# Patient Record
Sex: MALE | Race: WHITE | NOT HISPANIC OR LATINO | URBAN - METROPOLITAN AREA
[De-identification: names, ages, dates, MRNs, and addresses within clinical notes are randomized per-mention and may not be internally consistent; named-entity substitution may affect disease eponyms.]

---

## 2021-09-03 ENCOUNTER — TELEPHONE (OUTPATIENT)
Dept: ENDOCRINOLOGY | Facility: MEDICAL CENTER | Age: 46
End: 2021-09-03

## 2021-09-03 ENCOUNTER — OFFICE VISIT (OUTPATIENT)
Dept: ENDOCRINOLOGY | Facility: MEDICAL CENTER | Age: 46
End: 2021-09-03
Attending: NURSE PRACTITIONER
Payer: COMMERCIAL

## 2021-09-03 VITALS
SYSTOLIC BLOOD PRESSURE: 128 MMHG | HEIGHT: 76 IN | WEIGHT: 257.1 LBS | HEART RATE: 96 BPM | OXYGEN SATURATION: 96 % | BODY MASS INDEX: 31.31 KG/M2 | DIASTOLIC BLOOD PRESSURE: 80 MMHG

## 2021-09-03 DIAGNOSIS — E55.9 VITAMIN D DEFICIENCY: ICD-10-CM

## 2021-09-03 DIAGNOSIS — E05.90 HYPERTHYROIDISM: ICD-10-CM

## 2021-09-03 PROBLEM — R03.0 ELEVATED BP WITHOUT DIAGNOSIS OF HYPERTENSION: Status: ACTIVE | Noted: 2021-03-09

## 2021-09-03 PROBLEM — H83.3X2 NOISE-INDUCED HEARING LOSS OF LEFT EAR: Status: ACTIVE | Noted: 2021-03-09

## 2021-09-03 PROBLEM — E66.9 OBESITY (BMI 30.0-34.9): Status: ACTIVE | Noted: 2021-03-09

## 2021-09-03 PROBLEM — F17.200 CURRENT EVERY DAY SMOKER: Status: ACTIVE | Noted: 2021-03-09

## 2021-09-03 PROCEDURE — 99211 OFF/OP EST MAY X REQ PHY/QHP: CPT | Performed by: NURSE PRACTITIONER

## 2021-09-03 PROCEDURE — 99214 OFFICE O/P EST MOD 30 MIN: CPT | Performed by: NURSE PRACTITIONER

## 2021-09-03 RX ORDER — CLARITHROMYCIN 500 MG/1
TABLET, COATED ORAL
COMMUNITY
Start: 2021-07-14

## 2021-09-03 RX ORDER — METHIMAZOLE 10 MG/1
10 TABLET ORAL
Qty: 60 TABLET | Refills: 4 | Status: SHIPPED | OUTPATIENT
Start: 2021-09-03 | End: 2022-02-28

## 2021-09-03 RX ORDER — SILDENAFIL CITRATE 20 MG/1
TABLET ORAL
COMMUNITY
Start: 2021-07-14

## 2021-09-03 NOTE — TELEPHONE ENCOUNTER
VOICEMAIL  1. Caller Name: Mookie Tay                        Call Back Number: [phone]      2. Message: Patient called because he was told by kalani today that he had new medication sent to pharmacy. He was wondering if she had sent it to his pharmacy.     3. Patient approves office to leave a detailed voicemail/MyChart message: yes

## 2021-11-17 ENCOUNTER — TELEMEDICINE (OUTPATIENT)
Dept: ENDOCRINOLOGY | Facility: MEDICAL CENTER | Age: 46
End: 2021-11-17
Attending: NURSE PRACTITIONER
Payer: COMMERCIAL

## 2021-11-17 DIAGNOSIS — E55.9 VITAMIN D DEFICIENCY: ICD-10-CM

## 2021-11-17 DIAGNOSIS — E05.90 HYPERTHYROIDISM: ICD-10-CM

## 2021-11-17 PROCEDURE — 99214 OFFICE O/P EST MOD 30 MIN: CPT | Mod: 95 | Performed by: NURSE PRACTITIONER

## 2021-11-17 PROCEDURE — 999999 HB NO CHARGE: Performed by: NURSE PRACTITIONER

## 2021-11-17 RX ORDER — AZITHROMYCIN 250 MG/1
TABLET, FILM COATED ORAL
COMMUNITY
Start: 2021-11-12

## 2021-11-17 RX ORDER — MONTELUKAST SODIUM 10 MG/1
TABLET ORAL
COMMUNITY
Start: 2021-10-27

## 2021-11-17 NOTE — PROGRESS NOTES
Chief Complaint: Follow up evaluation of Hyperthyroidism.   Patient was presented for a telehealth consultation via secure and encrypted videoconferencing technology. This encounter was conducted via Zoom . Verbal consent was obtained. Patient's identity was verified.  HPI:     Mookie Tay is a 45 y.o. male with history of Hyperthyroidism diagnosed in 2013 and is here for initial evaluation.  He reports the following symptoms: cold intolerance & heat intolerance, almost lost consciousness with increased temperatures, increased palpitations, insomnia, diarrhea and constipation. Denies lumps or enlargement in the neck. Denies dysphagia, SOB and anterior neck pain.    Patient reports overall he is feeling much improved.  Insomnia has resolved heat intolerance has improved significantly.  He is currently at home sick secondary to an upper respiratory infection.    Patient was unable to complete his labs prior to this appointment secondary to the current infection.      Patient is currently taking methimazole 20 mg daily.    Previous labs:   Labs Com[facility] at [facility] 03/09/2020: TSH 0.010, FT4 1.78  Labs Completed at [facility] [place] 05/19/2021: TSH 0.000, FT4 3.29.  Labs Completed at [facility] [place] 05/24/2021: TSH 0.000, T4 14.5. FT4 3.18.     Patient's medications, allergies, and social histories were reviewed and updated as appropriate.      ROS:     CONS:     No fever, no chills, no weight loss, no fatigue   EYES:      No diplopia, no blurry vision, no redness of eyes, no swelling of eyelids   ENT:    No hearing loss, No ear pain, No sore throat, no dysphagia, no neck swelling   CV:     No chest pain, no palpitations, no claudication, no orthopnea, no PND   PULM:    No SOB, no cough, no hemoptysis, no wheezing    GI:   No nausea, no vomiting, no diarrhea, no constipation, no bloody stools   :  Passing urine well, no dysuria, no hematuria   ENDO:   No polyuria, no polydipsia, no heat intolerance, no  cold intolerance   NEURO: No headaches, no dizziness, no convulsions, no tremors   MUSC:  No joint swellings, no arthralgias, no myalgias, no weakness   SKIN:   No rash, no ulcers, no dry skin   PSYCH:   No depression, no anxiety, no difficulty sleeping       Past Medical History:  Patient Active Problem List    Diagnosis Date Noted   • Current every day smoker 03/09/2021   • Elevated BP without diagnosis of hypertension 03/09/2021   • Noise-induced hearing loss of left ear 03/09/2021   • Obesity (BMI 30.0-34.9) 03/09/2021       Past Surgical History:  Past Surgical History:   Procedure Laterality Date   • ARTHROPLASTY          Allergies:  Patient has no allergy information on record.     Current Medications:    Current Outpatient Medications:   •  azithromycin (ZITHROMAX) 250 MG Tab, , Disp: , Rfl:   •  montelukast (SINGULAIR) 10 MG Tab, , Disp: , Rfl:   •  clarithromycin (BIAXIN) 500 MG Tab, , Disp: , Rfl:   •  sildenafil (REVATIO) 20 MG tablet, , Disp: , Rfl:   •  methimazole (TAPAZOLE) 10 MG Tab, Take 1 Tablet by mouth 2 (two) times a day., Disp: 60 Tablet, Rfl: 4    Social History:  Social History     Socioeconomic History   • Marital status:      Spouse name: Not on file   • Number of children: Not on file   • Years of education: Not on file   • Highest education level: Not on file   Occupational History   • Not on file   Tobacco Use   • Smoking status: Current Every Day Smoker   • Smokeless tobacco: Former User   Vaping Use   • Vaping Use: Never used   Substance and Sexual Activity   • Alcohol use: Yes     Comment: social   • Drug use: Never   • Sexual activity: Not on file   Other Topics Concern   • Not on file   Social History Narrative   • Not on file     Social Determinants of Health     Financial Resource Strain:    • Difficulty of Paying Living Expenses: Not on file   Food Insecurity:    • Worried About Running Out of Food in the Last Year: Not on file   • Ran Out of Food in the Last Year: Not on  file   Transportation Needs:    • Lack of Transportation (Medical): Not on file   • Lack of Transportation (Non-Medical): Not on file   Physical Activity:    • Days of Exercise per Week: Not on file   • Minutes of Exercise per Session: Not on file   Stress:    • Feeling of Stress : Not on file   Social Connections:    • Frequency of Communication with Friends and Family: Not on file   • Frequency of Social Gatherings with Friends and Family: Not on file   • Attends Jewish Services: Not on file   • Active Member of Clubs or Organizations: Not on file   • Attends Club or Organization Meetings: Not on file   • Marital Status: Not on file   Intimate Partner Violence:    • Fear of Current or Ex-Partner: Not on file   • Emotionally Abused: Not on file   • Physically Abused: Not on file   • Sexually Abused: Not on file   Housing Stability:    • Unable to Pay for Housing in the Last Year: Not on file   • Number of Places Lived in the Last Year: Not on file   • Unstable Housing in the Last Year: Not on file        Family History:   Family History   Family history unknown: Yes         PHYSICAL EXAM:   Vital signs: There were no vitals taken for this visit.  GENERAL: Well-developed, well-nourished  in no apparent distress.   EYE: No ocular and eyelid asymmetry, Anicteric sclerae,  PERRL, No exophthalmos or lidlag  HENT: Hearing grossly intact, Normocephalic, atraumatic. Pink, moist mucous membranes, No exudate  NECK: Supple. Trachea midline. Thyromegaly.  CARDIOVASCULAR: Regular rate and rhythm. No murmurs, rubs, or gallops.   LUNGS: Clear to auscultation bilaterally   ABDOMEN: Soft, nontender with positive bowel sounds.   EXTREMITIES: No clubbing, cyanosis, or edema.   NEUROLOGICAL: Cranial nerves II-XII are grossly intact   Symmetric reflexes at the patella no proximal muscle weakness, No visible tremor with both outstretched hands  LYMPH: No cervical, supraclavicular, adenopathy seen.   SKIN: No rashes, lesions. Turgor  is normal. Moist, warm to touch.    ASSESSMENT/PLAN:   1. Hyperthyroidism  Unstable, improved  Continue taking methimazole 20mg daily.   Recommend labs to be drawn as soon as patient is able to.    Patient is currently not experiencing palpitations or tachycardia or increased anxiousness.    Future Labs Orders:     - T3 FREE; Future  - FREE THYROXINE; Future  - TSI; Future  - TSH; Future  - THYROID PEROXIDASE  (TPO) AB; Future  - VITAMIN D,25 HYDROXY; Future    2. Vitamin D deficiency  Unstable.   Recommend current lab assay to further assess Vitamin D supplementation requirement.     Complete labs 1 week prior to next appointment.   Next appointment in 3 months.     Thank you kindly for allowing me to participate in the thyroid care plan for this patient.    Rupinder Mancilla, APRN  November 17, 2021    CC:   Jeremiah Garcia M.D.

## 2021-12-10 DIAGNOSIS — E05.90 HYPERTHYROIDISM: ICD-10-CM

## 2022-02-28 DIAGNOSIS — E05.90 HYPERTHYROIDISM: ICD-10-CM

## 2022-02-28 RX ORDER — METHIMAZOLE 10 MG/1
TABLET ORAL
Qty: 60 TABLET | Refills: 0 | Status: SHIPPED | OUTPATIENT
Start: 2022-02-28 | End: 2022-05-24

## 2022-03-08 ENCOUNTER — TELEPHONE (OUTPATIENT)
Dept: ENDOCRINOLOGY | Facility: MEDICAL CENTER | Age: 47
End: 2022-03-08
Payer: COMMERCIAL

## 2022-03-08 NOTE — TELEPHONE ENCOUNTER
VOICEMAIL  1. Caller Name: Mookie Tay                        Call Back Number: [phone] (home)      2. Message: Patient called and left message stating he missed his appointment because he thought it was a video. I have called him and left him a message stating for him to call us back to reschedule the appointment.     3. Patient approves office to leave a detailed voicemail/MyChart message: yes

## 2022-05-24 DIAGNOSIS — E05.90 HYPERTHYROIDISM: ICD-10-CM

## 2022-05-24 RX ORDER — METHIMAZOLE 10 MG/1
TABLET ORAL
Qty: 60 TABLET | Refills: 0 | Status: SHIPPED | OUTPATIENT
Start: 2022-05-24